# Patient Record
Sex: FEMALE | Race: WHITE | NOT HISPANIC OR LATINO
[De-identification: names, ages, dates, MRNs, and addresses within clinical notes are randomized per-mention and may not be internally consistent; named-entity substitution may affect disease eponyms.]

---

## 2020-10-30 ENCOUNTER — RESULT REVIEW (OUTPATIENT)
Age: 25
End: 2020-10-30

## 2021-11-05 ENCOUNTER — RESULT REVIEW (OUTPATIENT)
Age: 26
End: 2021-11-05

## 2022-11-11 ENCOUNTER — APPOINTMENT (OUTPATIENT)
Dept: OBGYN | Facility: CLINIC | Age: 27
End: 2022-11-11

## 2022-11-11 ENCOUNTER — RESULT REVIEW (OUTPATIENT)
Age: 27
End: 2022-11-11

## 2022-11-11 PROCEDURE — 81002 URINALYSIS NONAUTO W/O SCOPE: CPT

## 2022-11-11 PROCEDURE — 76830 TRANSVAGINAL US NON-OB: CPT

## 2022-11-11 PROCEDURE — 99395 PREV VISIT EST AGE 18-39: CPT | Mod: 25

## 2023-06-12 ENCOUNTER — APPOINTMENT (OUTPATIENT)
Dept: OBGYN | Facility: CLINIC | Age: 28
End: 2023-06-12
Payer: COMMERCIAL

## 2023-06-12 PROCEDURE — 76830 TRANSVAGINAL US NON-OB: CPT

## 2023-06-12 PROCEDURE — 99213 OFFICE O/P EST LOW 20 MIN: CPT

## 2023-06-12 PROCEDURE — 81025 URINE PREGNANCY TEST: CPT

## 2023-06-12 PROCEDURE — 36415 COLL VENOUS BLD VENIPUNCTURE: CPT

## 2023-08-24 ENCOUNTER — APPOINTMENT (OUTPATIENT)
Dept: OBGYN | Facility: CLINIC | Age: 28
End: 2023-08-24
Payer: COMMERCIAL

## 2023-08-24 PROCEDURE — 0500F INITIAL PRENATAL CARE VISIT: CPT

## 2023-09-15 ENCOUNTER — APPOINTMENT (OUTPATIENT)
Dept: OBGYN | Facility: CLINIC | Age: 28
End: 2023-09-15
Payer: COMMERCIAL

## 2023-09-15 PROCEDURE — 36415 COLL VENOUS BLD VENIPUNCTURE: CPT

## 2023-09-15 PROCEDURE — 0502F SUBSEQUENT PRENATAL CARE: CPT

## 2023-09-15 PROCEDURE — 76817 TRANSVAGINAL US OBSTETRIC: CPT

## 2023-09-29 ENCOUNTER — APPOINTMENT (OUTPATIENT)
Dept: OBGYN | Facility: CLINIC | Age: 28
End: 2023-09-29
Payer: COMMERCIAL

## 2023-09-29 PROCEDURE — 0502F SUBSEQUENT PRENATAL CARE: CPT

## 2023-09-29 PROCEDURE — 36415 COLL VENOUS BLD VENIPUNCTURE: CPT

## 2023-09-29 PROCEDURE — 76813 OB US NUCHAL MEAS 1 GEST: CPT

## 2023-10-12 ENCOUNTER — APPOINTMENT (OUTPATIENT)
Dept: OBGYN | Facility: CLINIC | Age: 28
End: 2023-10-12
Payer: COMMERCIAL

## 2023-10-12 PROCEDURE — 0502F SUBSEQUENT PRENATAL CARE: CPT

## 2023-10-30 ENCOUNTER — APPOINTMENT (OUTPATIENT)
Dept: OBGYN | Facility: CLINIC | Age: 28
End: 2023-10-30
Payer: COMMERCIAL

## 2023-10-30 PROCEDURE — 36415 COLL VENOUS BLD VENIPUNCTURE: CPT

## 2023-10-30 PROCEDURE — 0502F SUBSEQUENT PRENATAL CARE: CPT

## 2023-11-02 ENCOUNTER — TRANSCRIPTION ENCOUNTER (OUTPATIENT)
Age: 28
End: 2023-11-02

## 2023-11-20 ENCOUNTER — ASOB RESULT (OUTPATIENT)
Age: 28
End: 2023-11-20

## 2023-11-20 ENCOUNTER — APPOINTMENT (OUTPATIENT)
Dept: OBGYN | Facility: CLINIC | Age: 28
End: 2023-11-20
Payer: COMMERCIAL

## 2023-11-20 ENCOUNTER — APPOINTMENT (OUTPATIENT)
Dept: ANTEPARTUM | Facility: CLINIC | Age: 28
End: 2023-11-20
Payer: COMMERCIAL

## 2023-11-20 PROBLEM — Z00.00 ENCOUNTER FOR PREVENTIVE HEALTH EXAMINATION: Status: ACTIVE | Noted: 2023-11-20

## 2023-11-20 PROCEDURE — 0502F SUBSEQUENT PRENATAL CARE: CPT

## 2023-11-20 PROCEDURE — 76811 OB US DETAILED SNGL FETUS: CPT

## 2023-11-27 ENCOUNTER — APPOINTMENT (OUTPATIENT)
Dept: ANTEPARTUM | Facility: CLINIC | Age: 28
End: 2023-11-27

## 2023-11-27 ENCOUNTER — ASOB RESULT (OUTPATIENT)
Age: 28
End: 2023-11-27

## 2023-11-27 ENCOUNTER — APPOINTMENT (OUTPATIENT)
Dept: OBGYN | Facility: CLINIC | Age: 28
End: 2023-11-27

## 2023-12-15 ENCOUNTER — APPOINTMENT (OUTPATIENT)
Dept: OBGYN | Facility: CLINIC | Age: 28
End: 2023-12-15
Payer: COMMERCIAL

## 2023-12-15 PROCEDURE — 0502F SUBSEQUENT PRENATAL CARE: CPT

## 2024-01-12 ENCOUNTER — APPOINTMENT (OUTPATIENT)
Dept: OBGYN | Facility: CLINIC | Age: 29
End: 2024-01-12
Payer: COMMERCIAL

## 2024-01-12 PROCEDURE — 36415 COLL VENOUS BLD VENIPUNCTURE: CPT

## 2024-01-12 PROCEDURE — 76819 FETAL BIOPHYS PROFIL W/O NST: CPT | Mod: 59

## 2024-01-12 PROCEDURE — 0502F SUBSEQUENT PRENATAL CARE: CPT

## 2024-01-12 PROCEDURE — 76816 OB US FOLLOW-UP PER FETUS: CPT

## 2024-01-26 ENCOUNTER — APPOINTMENT (OUTPATIENT)
Dept: OBGYN | Facility: CLINIC | Age: 29
End: 2024-01-26
Payer: COMMERCIAL

## 2024-01-26 PROCEDURE — 0502F SUBSEQUENT PRENATAL CARE: CPT

## 2024-01-26 PROCEDURE — 76816 OB US FOLLOW-UP PER FETUS: CPT

## 2024-02-12 ENCOUNTER — APPOINTMENT (OUTPATIENT)
Dept: OBGYN | Facility: CLINIC | Age: 29
End: 2024-02-12
Payer: COMMERCIAL

## 2024-02-12 PROCEDURE — 90471 IMMUNIZATION ADMIN: CPT

## 2024-02-12 PROCEDURE — 90715 TDAP VACCINE 7 YRS/> IM: CPT

## 2024-02-12 PROCEDURE — 0502F SUBSEQUENT PRENATAL CARE: CPT

## 2024-02-12 PROCEDURE — 76819 FETAL BIOPHYS PROFIL W/O NST: CPT | Mod: 59

## 2024-02-12 PROCEDURE — 76816 OB US FOLLOW-UP PER FETUS: CPT

## 2024-02-27 ENCOUNTER — APPOINTMENT (OUTPATIENT)
Dept: OBGYN | Facility: CLINIC | Age: 29
End: 2024-02-27
Payer: COMMERCIAL

## 2024-02-27 PROCEDURE — 76819 FETAL BIOPHYS PROFIL W/O NST: CPT | Mod: 59

## 2024-02-27 PROCEDURE — 0502F SUBSEQUENT PRENATAL CARE: CPT

## 2024-02-27 PROCEDURE — 76816 OB US FOLLOW-UP PER FETUS: CPT

## 2024-03-05 ENCOUNTER — APPOINTMENT (OUTPATIENT)
Dept: OBGYN | Facility: CLINIC | Age: 29
End: 2024-03-05
Payer: COMMERCIAL

## 2024-03-05 PROCEDURE — 99213 OFFICE O/P EST LOW 20 MIN: CPT

## 2024-03-13 ENCOUNTER — APPOINTMENT (OUTPATIENT)
Dept: OBGYN | Facility: CLINIC | Age: 29
End: 2024-03-13
Payer: COMMERCIAL

## 2024-03-13 PROCEDURE — 76818 FETAL BIOPHYS PROFILE W/NST: CPT | Mod: 59

## 2024-03-13 PROCEDURE — 76816 OB US FOLLOW-UP PER FETUS: CPT

## 2024-03-13 PROCEDURE — 0502F SUBSEQUENT PRENATAL CARE: CPT

## 2024-03-20 ENCOUNTER — APPOINTMENT (OUTPATIENT)
Dept: OBGYN | Facility: CLINIC | Age: 29
End: 2024-03-20
Payer: COMMERCIAL

## 2024-03-20 PROCEDURE — 76818 FETAL BIOPHYS PROFILE W/NST: CPT

## 2024-03-20 PROCEDURE — 0502F SUBSEQUENT PRENATAL CARE: CPT

## 2024-03-26 ENCOUNTER — APPOINTMENT (OUTPATIENT)
Dept: OBGYN | Facility: CLINIC | Age: 29
End: 2024-03-26
Payer: COMMERCIAL

## 2024-03-26 PROCEDURE — 76816 OB US FOLLOW-UP PER FETUS: CPT

## 2024-03-26 PROCEDURE — 76818 FETAL BIOPHYS PROFILE W/NST: CPT | Mod: 59

## 2024-03-26 PROCEDURE — 0502F SUBSEQUENT PRENATAL CARE: CPT

## 2024-03-28 ENCOUNTER — APPOINTMENT (OUTPATIENT)
Dept: OBGYN | Facility: CLINIC | Age: 29
End: 2024-03-28
Payer: COMMERCIAL

## 2024-03-28 ENCOUNTER — INPATIENT (INPATIENT)
Facility: HOSPITAL | Age: 29
LOS: 2 days | Discharge: HOME | End: 2024-03-31
Attending: OBSTETRICS & GYNECOLOGY | Admitting: OBSTETRICS & GYNECOLOGY
Payer: COMMERCIAL

## 2024-03-28 VITALS
SYSTOLIC BLOOD PRESSURE: 110 MMHG | RESPIRATION RATE: 16 BRPM | TEMPERATURE: 98 F | HEART RATE: 82 BPM | OXYGEN SATURATION: 97 % | DIASTOLIC BLOOD PRESSURE: 66 MMHG | WEIGHT: 160.06 LBS | HEIGHT: 64 IN

## 2024-03-28 DIAGNOSIS — Z3A.38 38 WEEKS GESTATION OF PREGNANCY: ICD-10-CM

## 2024-03-28 DIAGNOSIS — O26.899 OTHER SPECIFIED PREGNANCY RELATED CONDITIONS, UNSPECIFIED TRIMESTER: ICD-10-CM

## 2024-03-28 LAB
BASOPHILS # BLD AUTO: 0.03 K/UL — SIGNIFICANT CHANGE UP (ref 0–0.2)
BASOPHILS NFR BLD AUTO: 0.3 % — SIGNIFICANT CHANGE UP (ref 0–2)
EOSINOPHIL # BLD AUTO: 0.04 K/UL — SIGNIFICANT CHANGE UP (ref 0–0.5)
EOSINOPHIL NFR BLD AUTO: 0.4 % — SIGNIFICANT CHANGE UP (ref 0–6)
HCT VFR BLD CALC: 36.7 % — SIGNIFICANT CHANGE UP (ref 34.5–45)
HGB BLD-MCNC: 12.7 G/DL — SIGNIFICANT CHANGE UP (ref 11.5–15.5)
IMM GRANULOCYTES NFR BLD AUTO: 0.8 % — SIGNIFICANT CHANGE UP (ref 0–0.9)
LYMPHOCYTES # BLD AUTO: 2.72 K/UL — SIGNIFICANT CHANGE UP (ref 1–3.3)
LYMPHOCYTES # BLD AUTO: 28 % — SIGNIFICANT CHANGE UP (ref 13–44)
MCHC RBC-ENTMCNC: 31.2 PG — SIGNIFICANT CHANGE UP (ref 27–34)
MCHC RBC-ENTMCNC: 34.6 GM/DL — SIGNIFICANT CHANGE UP (ref 32–36)
MCV RBC AUTO: 90.2 FL — SIGNIFICANT CHANGE UP (ref 80–100)
MONOCYTES # BLD AUTO: 0.86 K/UL — SIGNIFICANT CHANGE UP (ref 0–0.9)
MONOCYTES NFR BLD AUTO: 8.8 % — SIGNIFICANT CHANGE UP (ref 2–14)
NEUTROPHILS # BLD AUTO: 5.99 K/UL — SIGNIFICANT CHANGE UP (ref 1.8–7.4)
NEUTROPHILS NFR BLD AUTO: 61.7 % — SIGNIFICANT CHANGE UP (ref 43–77)
NRBC # BLD: 0 /100 WBCS — SIGNIFICANT CHANGE UP (ref 0–0)
PLATELET # BLD AUTO: 198 K/UL — SIGNIFICANT CHANGE UP (ref 150–400)
RBC # BLD: 4.07 M/UL — SIGNIFICANT CHANGE UP (ref 3.8–5.2)
RBC # FLD: 12.8 % — SIGNIFICANT CHANGE UP (ref 10.3–14.5)
WBC # BLD: 9.72 K/UL — SIGNIFICANT CHANGE UP (ref 3.8–10.5)
WBC # FLD AUTO: 9.72 K/UL — SIGNIFICANT CHANGE UP (ref 3.8–10.5)

## 2024-03-28 PROCEDURE — 76818 FETAL BIOPHYS PROFILE W/NST: CPT

## 2024-03-28 PROCEDURE — 0502F SUBSEQUENT PRENATAL CARE: CPT

## 2024-03-28 RX ORDER — SODIUM CHLORIDE 9 MG/ML
1000 INJECTION, SOLUTION INTRAVENOUS
Refills: 0 | Status: DISCONTINUED | OUTPATIENT
Start: 2024-03-28 | End: 2024-03-29

## 2024-03-28 RX ORDER — OXYTOCIN 10 UNIT/ML
333.33 VIAL (ML) INJECTION
Qty: 20 | Refills: 0 | Status: DISCONTINUED | OUTPATIENT
Start: 2024-03-28 | End: 2024-03-31

## 2024-03-28 RX ORDER — CITRIC ACID/SODIUM CITRATE 300-500 MG
15 SOLUTION, ORAL ORAL EVERY 6 HOURS
Refills: 0 | Status: DISCONTINUED | OUTPATIENT
Start: 2024-03-28 | End: 2024-03-29

## 2024-03-28 RX ORDER — CHLORHEXIDINE GLUCONATE 213 G/1000ML
1 SOLUTION TOPICAL DAILY
Refills: 0 | Status: DISCONTINUED | OUTPATIENT
Start: 2024-03-28 | End: 2024-03-29

## 2024-03-28 RX ADMIN — SODIUM CHLORIDE 125 MILLILITER(S): 9 INJECTION, SOLUTION INTRAVENOUS at 20:56

## 2024-03-28 NOTE — PRE-ANESTHESIA EVALUATION ADULT - NSANTHPMHFT_GEN_ALL_CORE
38.4 weeks gestation; oligohydramnios; marginal cord insertion; iris nevus; vit D deficiency; lipoprotein deficiency disorder; punctate palmoplantar keratoderma;

## 2024-03-28 NOTE — OB PROVIDER H&P - HISTORY OF PRESENT ILLNESS
OB PA Admission H&P    28yoF  @38.4 weeks gestation (MITCH 24) presents for IOL 2/2 oligohydramnios (FABIAN 4.7). Pt was seen for routine prenatal visit earlier today where sonogram showed FABIAN 4.7. Pt scheduled for induction of labor. PNC has been uncomplicated. Endorses +FM. Denies -LOF. -CTXs. -VB. GBS negative. Pt denies any other concerns.    – PNC: marginal cord insertion, oligohydramnios 4.7. GBS negative. EFW 3200g.   – OBHx: primigravida   – GynHx: denies h/o fibroids, ovarian cysts, abnl pap smears, STDs  – PMH: denies h/o HTN, DM, asthma, thyroid disorders, bleeding disorders, h/o blood transfusions   – PSH: denies  – Psych: denies anxiety/depression   – Social: denies alcohol/tobacco/drug use in pregnancy   – Meds: PNV   – Allergies: NKDA  – Will accept blood transfusions: Yes    Vital Signs Last 24 Hrs  T(C): 36.8 (28 Mar 2024 19:57), Max: 36.8 (28 Mar 2024 19:44)  T(F): 98.24 (28 Mar 2024 19:57), Max: 98.24 (28 Mar 2024 19:57)  HR: 81 (28 Mar 2024 21:43) (69 - 92)  BP: 110/66 (28 Mar 2024 20:23) (110/66 - 110/66)  RR: 16 (28 Mar 2024 19:44) (16 - 16)  SpO2: 96% (28 Mar 2024 21:43) (92% - 97%)    Gen: NAD  CV: RRR  Lungs: CTA b/l   Abd: gravid, non-tender  Ext: BLE non-edematous, no calf tenderness b/l     – VE: 0/0/-3 exam in office earlier today   – FHT: baseline 130, mod variability, +accels, -decels  – [place]: infrequent   – EFW: 3200g   – Sono: vertex

## 2024-03-28 NOTE — OB PROVIDER H&P - ASSESSMENT
A/P: 28yoF  @38.4 weeks gestation (MITCH 24) admitted for IOL 2/2 oligohydramnios (FABIAN 4.7).   - Admit to L&D  - Routine Labs. IVF   - EFM/[place]: continuous monitoring   - IOL with PO Cytotec & Cervical balloon @12a   - GBS negative   - Anesthesia consult -> epidural prn   - D/w Dr. Luis Tomlinson, PA-C

## 2024-03-28 NOTE — OB RN PATIENT PROFILE - FALL HARM RISK - UNIVERSAL INTERVENTIONS
Bed in lowest position, wheels locked, appropriate side rails in place/Call bell, personal items and telephone in reach/Instruct patient to call for assistance before getting out of bed or chair/Non-slip footwear when patient is out of bed/[place] to call system/Purposeful Proactive Rounding/Room/bathroom lighting operational, light cord in reach

## 2024-03-28 NOTE — OB RN PATIENT PROFILE - FUNCTIONAL ASSESSMENT - BASIC MOBILITY 6.
4-calculated by average/Not able to assess (calculate score using [facility] averaging method)

## 2024-03-29 LAB — T PALLIDUM AB TITR SER: NEGATIVE — SIGNIFICANT CHANGE UP

## 2024-03-29 PROCEDURE — 59400 OBSTETRICAL CARE: CPT

## 2024-03-29 RX ORDER — AER TRAVELER 0.5 G/1
1 SOLUTION RECTAL; TOPICAL EVERY 4 HOURS
Refills: 0 | Status: DISCONTINUED | OUTPATIENT
Start: 2024-03-29 | End: 2024-03-31

## 2024-03-29 RX ORDER — ACETAMINOPHEN 500 MG
975 TABLET ORAL
Refills: 0 | Status: DISCONTINUED | OUTPATIENT
Start: 2024-03-29 | End: 2024-03-31

## 2024-03-29 RX ORDER — PRAMOXINE HYDROCHLORIDE 150 MG/15G
1 AEROSOL, FOAM RECTAL EVERY 4 HOURS
Refills: 0 | Status: DISCONTINUED | OUTPATIENT
Start: 2024-03-29 | End: 2024-03-31

## 2024-03-29 RX ORDER — LANOLIN
1 OINTMENT (GRAM) TOPICAL EVERY 6 HOURS
Refills: 0 | Status: DISCONTINUED | OUTPATIENT
Start: 2024-03-29 | End: 2024-03-31

## 2024-03-29 RX ORDER — TETANUS TOXOID, REDUCED DIPHTHERIA TOXOID AND ACELLULAR PERTUSSIS VACCINE, ADSORBED 5; 2.5; 8; 8; 2.5 [IU]/.5ML; [IU]/.5ML; UG/.5ML; UG/.5ML; UG/.5ML
0.5 SUSPENSION INTRAMUSCULAR ONCE
Refills: 0 | Status: DISCONTINUED | OUTPATIENT
Start: 2024-03-29 | End: 2024-03-31

## 2024-03-29 RX ORDER — OXYTOCIN 10 UNIT/ML
41.67 VIAL (ML) INJECTION
Qty: 20 | Refills: 0 | Status: DISCONTINUED | OUTPATIENT
Start: 2024-03-29 | End: 2024-03-31

## 2024-03-29 RX ORDER — DIBUCAINE 1 %
1 OINTMENT (GRAM) RECTAL EVERY 6 HOURS
Refills: 0 | Status: DISCONTINUED | OUTPATIENT
Start: 2024-03-29 | End: 2024-03-31

## 2024-03-29 RX ORDER — MAGNESIUM HYDROXIDE 400 MG/1
30 TABLET, CHEWABLE ORAL
Refills: 0 | Status: DISCONTINUED | OUTPATIENT
Start: 2024-03-29 | End: 2024-03-31

## 2024-03-29 RX ORDER — OXYCODONE HYDROCHLORIDE 5 MG/1
5 TABLET ORAL ONCE
Refills: 0 | Status: DISCONTINUED | OUTPATIENT
Start: 2024-03-29 | End: 2024-03-31

## 2024-03-29 RX ORDER — IBUPROFEN 200 MG
600 TABLET ORAL EVERY 6 HOURS
Refills: 0 | Status: COMPLETED | OUTPATIENT
Start: 2024-03-29 | End: 2025-02-25

## 2024-03-29 RX ORDER — SODIUM CHLORIDE 9 MG/ML
3 INJECTION INTRAMUSCULAR; INTRAVENOUS; SUBCUTANEOUS EVERY 8 HOURS
Refills: 0 | Status: DISCONTINUED | OUTPATIENT
Start: 2024-03-29 | End: 2024-03-31

## 2024-03-29 RX ORDER — IBUPROFEN 200 MG
600 TABLET ORAL EVERY 6 HOURS
Refills: 0 | Status: DISCONTINUED | OUTPATIENT
Start: 2024-03-29 | End: 2024-03-31

## 2024-03-29 RX ORDER — DIPHENHYDRAMINE HCL 50 MG
25 CAPSULE ORAL EVERY 6 HOURS
Refills: 0 | Status: DISCONTINUED | OUTPATIENT
Start: 2024-03-29 | End: 2024-03-31

## 2024-03-29 RX ORDER — BENZOCAINE 10 %
1 GEL (GRAM) MUCOUS MEMBRANE EVERY 6 HOURS
Refills: 0 | Status: DISCONTINUED | OUTPATIENT
Start: 2024-03-29 | End: 2024-03-31

## 2024-03-29 RX ORDER — OXYTOCIN 10 UNIT/ML
4 VIAL (ML) INJECTION
Qty: 30 | Refills: 0 | Status: DISCONTINUED | OUTPATIENT
Start: 2024-03-29 | End: 2024-03-31

## 2024-03-29 RX ORDER — OXYCODONE HYDROCHLORIDE 5 MG/1
5 TABLET ORAL
Refills: 0 | Status: DISCONTINUED | OUTPATIENT
Start: 2024-03-29 | End: 2024-03-31

## 2024-03-29 RX ORDER — HYDROCORTISONE 1 %
1 OINTMENT (GRAM) TOPICAL EVERY 6 HOURS
Refills: 0 | Status: DISCONTINUED | OUTPATIENT
Start: 2024-03-29 | End: 2024-03-31

## 2024-03-29 RX ORDER — SIMETHICONE 80 MG/1
80 TABLET, CHEWABLE ORAL EVERY 4 HOURS
Refills: 0 | Status: DISCONTINUED | OUTPATIENT
Start: 2024-03-29 | End: 2024-03-31

## 2024-03-29 RX ORDER — KETOROLAC TROMETHAMINE 30 MG/ML
30 SYRINGE (ML) INJECTION ONCE
Refills: 0 | Status: DISCONTINUED | OUTPATIENT
Start: 2024-03-29 | End: 2024-03-29

## 2024-03-29 RX ADMIN — Medication 4 MILLIUNIT(S)/MIN: at 05:59

## 2024-03-29 RX ADMIN — Medication 975 MILLIGRAM(S): at 21:27

## 2024-03-29 RX ADMIN — Medication 975 MILLIGRAM(S): at 22:00

## 2024-03-29 RX ADMIN — Medication 30 MILLIGRAM(S): at 19:50

## 2024-03-29 NOTE — OB PROVIDER LABOR PROGRESS NOTE - NS_DILATION_OBGYN_ALL_OB_NU
Continue with your usual pain medications.   Follow up with Dr. Muse and/or dentist for any ongoing pain management.    
5
5
0
0
2

## 2024-03-29 NOTE — OB PROVIDER DELIVERY SUMMARY - NSPROVIDERDELIVERYNOTE_OBGYN_ALL_OB_FT
IOL for oligohydramnios.  IOL with cytotec, balloon and pitocin.  Progressed to FD.  Pushed to undergo .  baby girl.  First degree laceration repaired with 2.0 vicryl rapide.  labial abrasions repaired with 3.0 vicryl rapide.  sphincter intact.    Lexy Granda  OB attg

## 2024-03-29 NOTE — OB PROVIDER LABOR PROGRESS NOTE - NS_SUBJECTIVE/OBJECTIVE_OBGYN_ALL_OB_FT
Pt seen and examined at bedside for cervical balloon placement.   VE 0/0/-3 unchanged   Pt consulted about epidural for pain intervention for CB placement, declining at this time  Requesting to try for cervical balloon again at 2-3a without pain intervention     Vital Signs Last 24 Hrs  T(C): 36.8 (28 Mar 2024 23:26), Max: 36.8 (28 Mar 2024 19:44)  T(F): 98.24 (28 Mar 2024 23:26), Max: 98.24 (28 Mar 2024 19:57)  HR: 68 (29 Mar 2024 01:15) (67 - 92)  BP: 117/79 (28 Mar 2024 23:26) (110/66 - 117/79)  RR: 18 (28 Mar 2024 23:26) (16 - 18)  SpO2: 98% (29 Mar 2024 01:15) (89% - 99%)
pt concerned because she saw blood
R4 Labor Note    S: Patient evaluated at bedside for cervical change.     O:  T(C): 36.8 (03-28-24 @ 23:26), Max: 36.8 (03-28-24 @ 19:44)  HR: 64 (03-29-24 @ 01:35) (64 - 92)  BP: 117/79 (03-28-24 @ 23:26) (110/66 - 117/79)  RR: 18 (03-28-24 @ 23:26) (16 - 18)  SpO2: 97% (03-29-24 @ 01:35) (89% - 99%)
pt is comfortable after epidural
pt is undergoing iol

## 2024-03-29 NOTE — OB PROVIDER LABOR PROGRESS NOTE - NS_OBIHIFHRDETAILS_OBGYN_ALL_OB_FT
135/mod/+accels/occ late Cat II
140/mod/+accels/-decels Cat I
130 mod variability +accels -decels
Baseline 130, mod mendez, +accels, - decels doiscontinuous secondary to krishna
130/mod/+accels/+early decels Cat I

## 2024-03-29 NOTE — OB RN DELIVERY SUMMARY - NS_SEPSISRSKCALC_OBGYN_ALL_OB_FT
EOS calculated successfully. EOS Risk Factor: 0.5/1000 live births ([facility] national incidence); GA=38w5d; Temp=98.6; ROM=9.5; GBS='Unknown'; Antibiotics='No antibiotics or any antibiotics < 2 hrs prior to birth'

## 2024-03-29 NOTE — OB RN DELIVERY SUMMARY - NSSELHIDDEN_OBGYN_ALL_OB_FT
[NS_DeliveryAttending1_OBGYN_ALL_OB_FT:[id number]],[NS_DeliveryRN_OBGYN_ALL_OB_FT:[id number]]

## 2024-03-29 NOTE — OB PROVIDER LABOR PROGRESS NOTE - ASSESSMENT
A/P 28y P0 @ 38/5 wks IOL  -IOL:Continue on PO cytotec   -CB placed 60/60 without incident   -Cat 1 tracing  -Analgesia : None desired at this time   -Anticipate     d/w Dr. Jesus Alberto Zhao PGY-4
balloon still in place  declining epidural  cont current management    Lexy Granda  OB attg
restart pitocin  peanut ball  reexamine prn    Lexy Granda  OB attg
balloon in vagina  already srom  peanut ball  cont pitocin    Lexy Granda  OB attg
A/P: 28yoF  @38.5 weeks gestation (MITCH 24) admitted for IOL 2/2 oligohydramnios (FABIAN 4.7).   - IOL with PO Cytotec   - Plan to re-attempt Cervical balloon placement at 2-3a   - EFM/[place]: continuous monitoring   - GBS negative   - Anesthesia consult -> epidural prn   - D/w Dr. Jesus Alberto Tomlinson, PA-C

## 2024-03-30 RX ADMIN — Medication 600 MILLIGRAM(S): at 05:21

## 2024-03-30 RX ADMIN — Medication 975 MILLIGRAM(S): at 08:39

## 2024-03-30 RX ADMIN — Medication 1 TABLET(S): at 11:35

## 2024-03-30 RX ADMIN — Medication 975 MILLIGRAM(S): at 09:35

## 2024-03-30 RX ADMIN — Medication 975 MILLIGRAM(S): at 14:57

## 2024-03-30 RX ADMIN — Medication 975 MILLIGRAM(S): at 02:45

## 2024-03-30 RX ADMIN — Medication 975 MILLIGRAM(S): at 20:45

## 2024-03-30 RX ADMIN — Medication 975 MILLIGRAM(S): at 15:50

## 2024-03-30 RX ADMIN — Medication 975 MILLIGRAM(S): at 02:18

## 2024-03-30 RX ADMIN — Medication 600 MILLIGRAM(S): at 18:55

## 2024-03-30 RX ADMIN — Medication 600 MILLIGRAM(S): at 23:30

## 2024-03-30 RX ADMIN — Medication 600 MILLIGRAM(S): at 12:30

## 2024-03-30 RX ADMIN — Medication 975 MILLIGRAM(S): at 21:30

## 2024-03-30 RX ADMIN — Medication 600 MILLIGRAM(S): at 06:15

## 2024-03-30 RX ADMIN — Medication 600 MILLIGRAM(S): at 18:18

## 2024-03-30 RX ADMIN — Medication 600 MILLIGRAM(S): at 11:36

## 2024-03-30 NOTE — PROGRESS NOTE ADULT - SUBJECTIVE AND OBJECTIVE BOX
OB Progress Note:  PPD#1    S: 27yo PPD#1 s/p . Patient feels well. Pain is well controlled. She is tolerating a regular diet and passing flatus. She is voiding spontaneously, and ambulating without difficulty. Endorses light vaginal bleeding, soaking less than 1 pad/hour. Denies CP/SOB. Denies lightheadedness/dizziness. Denies N/V.     O:  Vitals:  Vital Signs Last 24 Hrs  T(C): 36.4 (30 Mar 2024 05:45), Max: 37.0 (29 Mar 2024 07:15)  T(F): 97.5 (30 Mar 2024 05:45), Max: 98.6 (29 Mar 2024 07:15)  HR: 80 (30 Mar 2024 05:45) (55 - 104)  BP: 102/69 (30 Mar 2024 05:45) (96/58 - 141/83)  BP(mean): --  RR: 18 (30 Mar 2024 05:45) (16 - 20)  SpO2: 98% (30 Mar 2024 05:45) (87% - 100%)    Parameters below as of 29 Mar 2024 20:16  Patient On (Oxygen Delivery Method): room air        MEDICATIONS  (STANDING):  acetaminophen     Tablet .. 975 milliGRAM(s) Oral <User Schedule>  diphtheria/tetanus/pertussis (acellular) Vaccine (Adacel) 0.5 milliLiter(s) IntraMuscular once  ibuprofen  Tablet. 600 milliGRAM(s) Oral every 6 hours  oxytocin Infusion 333.333 milliUNIT(s)/Min (1000 mL/Hr) IV Continuous <Continuous>  oxytocin Infusion 41.667 milliUNIT(s)/Min (125 mL/Hr) IV Continuous <Continuous>  oxytocin Infusion. 4 milliUNIT(s)/Min (4 mL/Hr) IV Continuous <Continuous>  prenatal multivitamin 1 Tablet(s) Oral daily  sodium chloride 0.9% lock flush 3 milliLiter(s) IV Push every 8 hours      Labs:  Blood type: B Positive  Rubella IgG: RPR: Negative                          12.7   9.72 >-----------< 198    (  @ 20:39 )             36.7                  Physical Exam:  General: NAD  Heart: all extremities well perfused  Lungs: breathing comfortably  Abdomen: soft, non-tender, non-distended, fundus firm  Vaginal: lochia wnl  Extremities: No calf tenderness or erythema

## 2024-03-30 NOTE — PROGRESS NOTE ADULT - ATTENDING COMMENTS
Patient seen and examined.  Agree with above.    Regular diet  ambulate  po pain meds  d/c tomorrow    Lexy Granda MD  OB attg

## 2024-03-31 ENCOUNTER — TRANSCRIPTION ENCOUNTER (OUTPATIENT)
Age: 29
End: 2024-03-31

## 2024-03-31 VITALS
RESPIRATION RATE: 18 BRPM | OXYGEN SATURATION: 96 % | DIASTOLIC BLOOD PRESSURE: 75 MMHG | TEMPERATURE: 98 F | SYSTOLIC BLOOD PRESSURE: 108 MMHG | HEART RATE: 60 BPM

## 2024-03-31 PROCEDURE — 86901 BLOOD TYPING SEROLOGIC RH(D): CPT

## 2024-03-31 PROCEDURE — 36415 COLL VENOUS BLD VENIPUNCTURE: CPT

## 2024-03-31 PROCEDURE — 59050 FETAL MONITOR W/REPORT: CPT

## 2024-03-31 PROCEDURE — 85025 COMPLETE CBC W/AUTO DIFF WBC: CPT

## 2024-03-31 PROCEDURE — 86850 RBC ANTIBODY SCREEN: CPT

## 2024-03-31 PROCEDURE — 86900 BLOOD TYPING SEROLOGIC ABO: CPT

## 2024-03-31 PROCEDURE — 86780 TREPONEMA PALLIDUM: CPT

## 2024-03-31 RX ORDER — IBUPROFEN 200 MG
1 TABLET ORAL
Qty: 0 | Refills: 0 | DISCHARGE
Start: 2024-03-31

## 2024-03-31 RX ORDER — ACETAMINOPHEN 500 MG
3 TABLET ORAL
Qty: 0 | Refills: 0 | DISCHARGE
Start: 2024-03-31

## 2024-03-31 RX ADMIN — Medication 975 MILLIGRAM(S): at 08:13

## 2024-03-31 RX ADMIN — Medication 600 MILLIGRAM(S): at 06:30

## 2024-03-31 RX ADMIN — Medication 600 MILLIGRAM(S): at 05:44

## 2024-03-31 RX ADMIN — Medication 975 MILLIGRAM(S): at 02:55

## 2024-03-31 RX ADMIN — Medication 975 MILLIGRAM(S): at 03:45

## 2024-03-31 RX ADMIN — Medication 600 MILLIGRAM(S): at 00:20

## 2024-03-31 RX ADMIN — Medication 975 MILLIGRAM(S): at 09:00

## 2024-03-31 NOTE — PROGRESS NOTE ADULT - ASSESSMENT
29y/o  PPD#1 from  in stable condition. Patient doing well.     -Continue with PO analgesia  -OOB, increase ambulation   -Continue regular diet    -No labs    Selina Potter,  PGY-1
A/P:  P1 PPD2 s/p .  doing well      stable for d/c  po pain meds  ambulate  reg diet    Lexy Granda  OB attg

## 2024-03-31 NOTE — PROGRESS NOTE ADULT - SUBJECTIVE AND OBJECTIVE BOX
S: Patient is doing well without complaints. Tolerates regular diet. She states lochia is in WNL. Ambulating without difficulty. Denies N/V. Voiding freely. Passing flatus. Pain well controlled with oral pain medications. Denies any HA/vision changes, CP/SOB, F/C/S.    O: Vital Signs Last 24 Hrs  T(C): 36.6 (31 Mar 2024 06:13), Max: 36.7 (30 Mar 2024 13:15)  T(F): 97.9 (31 Mar 2024 06:13), Max: 98.1 (30 Mar 2024 13:15)  HR: 60 (31 Mar 2024 06:13) (60 - 73)  BP: 108/75 (31 Mar 2024 06:13) (108/75 - 121/66)  BP(mean): --  RR: 18 (31 Mar 2024 06:13) (18 - 18)  SpO2: 96% (31 Mar 2024 06:13) (96% - 98%)    Parameters below as of 31 Mar 2024 06:13  Patient On (Oxygen Delivery Method): room air        Physical Exam:  General: NAD  Abdomen: soft, non-tender, non-distended, fundus firm  Vaginal: deferred  Ext: NTBL    Labs:                MEDICATIONS  (STANDING):  acetaminophen     Tablet .. 975 milliGRAM(s) Oral <User Schedule>  diphtheria/tetanus/pertussis (acellular) Vaccine (Adacel) 0.5 milliLiter(s) IntraMuscular once  ibuprofen  Tablet. 600 milliGRAM(s) Oral every 6 hours  oxytocin Infusion 41.667 milliUNIT(s)/Min (125 mL/Hr) IV Continuous <Continuous>  oxytocin Infusion 333.333 milliUNIT(s)/Min (1000 mL/Hr) IV Continuous <Continuous>  oxytocin Infusion. 4 milliUNIT(s)/Min (4 mL/Hr) IV Continuous <Continuous>  prenatal multivitamin 1 Tablet(s) Oral daily  sodium chloride 0.9% lock flush 3 milliLiter(s) IV Push every 8 hours

## 2024-03-31 NOTE — DISCHARGE NOTE OB - PATIENT PORTAL LINK FT
You can access the FollowMyHealth Patient Portal offered by [facility] by registering at the following website: [email or web address]. By joining [employer]’s FollowMyHealth portal, you will also be able to view your health information using other applications (apps) compatible with our system.

## 2024-03-31 NOTE — DISCHARGE NOTE OB - MATERIALS PROVIDED
[facility] [place] Screening Program/[place]  Immunization Record/Breastfeeding Log/Breastfeeding Mother’s Support Group Information/Guide to Postpartum Care/[facility] Hearing Screen Program/Back To Sleep Handout/Shaken Baby Prevention Handout/Breastfeeding Guide and Packet/Birth Certificate Instructions/Discharge Medication Information for Patients and Families Pocket Guide

## 2024-05-08 ENCOUNTER — APPOINTMENT (OUTPATIENT)
Dept: OBGYN | Facility: CLINIC | Age: 29
End: 2024-05-08
Payer: COMMERCIAL

## 2024-05-08 PROCEDURE — 0503F POSTPARTUM CARE VISIT: CPT

## 2024-08-26 ENCOUNTER — APPOINTMENT (OUTPATIENT)
Dept: OBGYN | Facility: CLINIC | Age: 29
End: 2024-08-26
Payer: COMMERCIAL

## 2024-08-26 PROCEDURE — 99459 PELVIC EXAMINATION: CPT

## 2024-08-26 PROCEDURE — 96127 BRIEF EMOTIONAL/BEHAV ASSMT: CPT

## 2024-08-26 PROCEDURE — 99395 PREV VISIT EST AGE 18-39: CPT

## 2025-05-07 ENCOUNTER — APPOINTMENT (OUTPATIENT)
Dept: OBGYN | Facility: CLINIC | Age: 30
End: 2025-05-07
Payer: COMMERCIAL

## 2025-05-07 PROCEDURE — 36415 COLL VENOUS BLD VENIPUNCTURE: CPT

## 2025-05-07 PROCEDURE — 99213 OFFICE O/P EST LOW 20 MIN: CPT

## 2025-06-25 ENCOUNTER — APPOINTMENT (OUTPATIENT)
Dept: OBGYN | Facility: CLINIC | Age: 30
End: 2025-06-25
Payer: COMMERCIAL

## 2025-06-25 PROCEDURE — 99213 OFFICE O/P EST LOW 20 MIN: CPT | Mod: 25

## 2025-06-25 PROCEDURE — 76856 US EXAM PELVIC COMPLETE: CPT

## 2025-06-25 PROCEDURE — 76830 TRANSVAGINAL US NON-OB: CPT

## 2025-08-11 ENCOUNTER — APPOINTMENT (OUTPATIENT)
Dept: OBGYN | Facility: CLINIC | Age: 30
End: 2025-08-11
Payer: COMMERCIAL

## 2025-08-11 PROCEDURE — 36415 COLL VENOUS BLD VENIPUNCTURE: CPT

## 2025-08-11 PROCEDURE — 76817 TRANSVAGINAL US OBSTETRIC: CPT

## 2025-08-11 PROCEDURE — 99214 OFFICE O/P EST MOD 30 MIN: CPT | Mod: 25
